# Patient Record
Sex: FEMALE | Race: WHITE | URBAN - METROPOLITAN AREA
[De-identification: names, ages, dates, MRNs, and addresses within clinical notes are randomized per-mention and may not be internally consistent; named-entity substitution may affect disease eponyms.]

---

## 2020-01-23 ENCOUNTER — OFFICE VISIT (OUTPATIENT)
Dept: PRIMARY CARE CLINIC | Age: 30
End: 2020-01-23

## 2020-01-23 VITALS
SYSTOLIC BLOOD PRESSURE: 102 MMHG | TEMPERATURE: 98.3 F | WEIGHT: 130.8 LBS | HEART RATE: 98 BPM | DIASTOLIC BLOOD PRESSURE: 63 MMHG | RESPIRATION RATE: 16 BRPM | BODY MASS INDEX: 21.02 KG/M2 | HEIGHT: 66 IN | OXYGEN SATURATION: 98 %

## 2020-01-23 DIAGNOSIS — J06.9 VIRAL URI WITH COUGH: Primary | ICD-10-CM

## 2020-01-23 DIAGNOSIS — R50.9 FEVER, UNSPECIFIED FEVER CAUSE: ICD-10-CM

## 2020-01-23 LAB
FLUAV+FLUBV AG NOSE QL IA.RAPID: NEGATIVE POS/NEG
FLUAV+FLUBV AG NOSE QL IA.RAPID: NEGATIVE POS/NEG
S PYO AG THROAT QL: NEGATIVE
VALID INTERNAL CONTROL?: YES
VALID INTERNAL CONTROL?: YES

## 2020-01-23 NOTE — PROGRESS NOTES
RM 4  Pt is nursing still    Chief Complaint   Patient presents with    Cold Symptoms     cough x 2 weeks, sore throat , low grade fever started yesterday,nasal congestion       Visit Vitals  /63 (BP 1 Location: Left arm, BP Patient Position: Sitting)   Pulse 98   Temp 98.3 °F (36.8 °C) (Oral)   Resp 16   Ht 5' 6\" (1.676 m)   Wt 130 lb 12.8 oz (59.3 kg)   SpO2 98%   BMI 21.11 kg/m²

## 2020-01-23 NOTE — PROGRESS NOTES
HISTORY OF PRESENT ILLNESS  Sarah Vital is a 26 y.o. female. Patient reports dry cough x 2 weeks, but congestion, fever, sore throat, and body aches started in the past 24 hours. Fever was 100.8. Patient has baby at home and wants to make sure she doesn't have the flu or strep. She has taken dayquil with some relief. Visit Vitals  /63 (BP 1 Location: Left arm, BP Patient Position: Sitting)   Pulse 98   Temp 98.3 °F (36.8 °C) (Oral)   Resp 16   Ht 5' 6\" (1.676 m)   Wt 130 lb 12.8 oz (59.3 kg)   LMP 08/23/2018 Comment: Pt is nursing   SpO2 98%   BMI 21.11 kg/m²       HPI    Review of Systems   Constitutional: Positive for fever. HENT: Positive for congestion and sore throat. Respiratory: Positive for cough. Musculoskeletal: Positive for myalgias. Physical Exam  Constitutional:       Appearance: Normal appearance. HENT:      Head: Normocephalic. Right Ear: Hearing, tympanic membrane, ear canal and external ear normal.      Left Ear: Hearing, tympanic membrane, ear canal and external ear normal.      Nose: Congestion and rhinorrhea present. Rhinorrhea is clear. Right Turbinates: Swollen. Left Turbinates: Swollen. Mouth/Throat:      Lips: Pink. Mouth: Mucous membranes are moist.      Pharynx: Oropharynx is clear. Neck:      Musculoskeletal: Normal range of motion. Cardiovascular:      Rate and Rhythm: Normal rate and regular rhythm. Pulmonary:      Effort: Pulmonary effort is normal.      Breath sounds: Normal breath sounds. Lymphadenopathy:      Cervical: No cervical adenopathy. Skin:     General: Skin is warm and dry. Neurological:      General: No focal deficit present. Mental Status: She is alert and oriented to person, place, and time. Psychiatric:         Mood and Affect: Mood normal.         Behavior: Behavior normal.         ASSESSMENT and PLAN    ICD-10-CM ICD-9-CM    1. Viral URI with cough J06.9 465.9     B97.89     2.  Fever, unspecified fever cause R50.9 780.60 AMB POC RAPID STREP A      AMB POC PANKAJ INFLUENZA A/B TEST     Orders Placed This Encounter    AMB POC RAPID STREP A    AMB POC PANKAJ INFLUENZA A/B TEST    DM/PE/acetaminophen/doxylamine (VICKS DAYQUIL-NYQUIL COLD-FLU PO)    PRENATAL VIT 54-QEOD FUM-FOLIC PO   add flonase  Rest and hydrate  Follow up if no improvement  Tylenol for fever

## 2020-01-30 ENCOUNTER — OFFICE VISIT (OUTPATIENT)
Dept: PRIMARY CARE CLINIC | Age: 30
End: 2020-01-30

## 2020-01-30 VITALS
BODY MASS INDEX: 21.44 KG/M2 | SYSTOLIC BLOOD PRESSURE: 121 MMHG | TEMPERATURE: 98 F | DIASTOLIC BLOOD PRESSURE: 75 MMHG | HEART RATE: 51 BPM | HEIGHT: 66 IN | RESPIRATION RATE: 18 BRPM | WEIGHT: 133.4 LBS | OXYGEN SATURATION: 98 %

## 2020-01-30 DIAGNOSIS — J01.00 ACUTE NON-RECURRENT MAXILLARY SINUSITIS: Primary | ICD-10-CM

## 2020-01-30 RX ORDER — FLUTICASONE PROPIONATE 50 MCG
2 SPRAY, SUSPENSION (ML) NASAL DAILY
Qty: 1 BOTTLE | Refills: 0 | Status: SHIPPED | OUTPATIENT
Start: 2020-01-30

## 2020-01-30 RX ORDER — AMOXICILLIN AND CLAVULANATE POTASSIUM 875; 125 MG/1; MG/1
1 TABLET, FILM COATED ORAL EVERY 12 HOURS
Qty: 14 TAB | Refills: 0 | Status: SHIPPED | OUTPATIENT
Start: 2020-01-30 | End: 2020-02-06

## 2020-01-30 NOTE — PROGRESS NOTES
Subjective:   Dg Woodruff is a 26 y.o. female who complains of congestion, headache, yellow nasal discharge,  and bilateral sinus pain for > 7  days, gradually worsening since that time. She denies a history of chills, fevers, shortness of breath and wheezing. Seen here last week for viral URI. Symptoms have persisted and worsened. Currently breastfeeding. Evaluation to date: none. Treatment to date: decongestants, OTC products. Patient does not smoke cigarettes. Relevant PMH: History reviewed. No pertinent past medical history. History reviewed. No pertinent surgical history. No Known Allergies        Review of Systems  Pertinent items are noted in HPI. Objective:     Visit Vitals  /75 (BP 1 Location: Left arm, BP Patient Position: Sitting)   Pulse (!) 51   Temp 98 °F (36.7 °C) (Oral)   Resp 18   Ht 5' 6\" (1.676 m)   Wt 133 lb 6.4 oz (60.5 kg)   SpO2 98%   BMI 21.53 kg/m²     General:  alert, cooperative, no distress   Eyes: negative   Ears: normal TM's and external ear canals AU   Sinuses: tenderness over bilateral maxillary   Mouth:  Lips, mucosa, and tongue normal. Teeth and gums normal and normal findings: oropharynx pink & moist without lesions or evidence of thrush   Neck: supple, symmetrical, trachea midline and no adenopathy. Heart: S1 and S2 normal, no murmurs noted. Lungs: clear to auscultation bilaterally        Assessment/Plan:       ICD-10-CM ICD-9-CM    1. Acute non-recurrent maxillary sinusitis J01.00 461.0      Orders Placed This Encounter    amoxicillin-clavulanate (AUGMENTIN) 875-125 mg per tablet    fluticasone propionate (FLONASE) 50 mcg/actuation nasal spray     Discussed the dx and tx of sinusitis. Suggested symptomatic OTC remedies. Antibiotics per orders. Nasal steroids per orders. RTC prn. Rosalee Park NP  This note will not be viewable in [address]

## 2020-01-30 NOTE — PATIENT INSTRUCTIONS
Sinusitis: Care Instructions Your Care Instructions Sinusitis is an infection of the lining of the sinus cavities in your head. Sinusitis often follows a cold. It causes pain and pressure in your head and face. In most cases, sinusitis gets better on its own in 1 to 2 weeks. But some mild symptoms may last for several weeks. Sometimes antibiotics are needed. Follow-up care is a key part of your treatment and safety. Be sure to make and go to all appointments, and call your doctor if you are having problems. It's also a good idea to know your test results and keep a list of the medicines you take. How can you care for yourself at home? · Take an over-the-counter pain medicine, such as acetaminophen (Tylenol), ibuprofen (Advil, Motrin), or naproxen (Aleve). Read and follow all instructions on the label. · If the doctor prescribed antibiotics, take them as directed. Do not stop taking them just because you feel better. You need to take the full course of antibiotics. · Be careful when taking over-the-counter cold or flu medicines and Tylenol at the same time. Many of these medicines have acetaminophen, which is Tylenol. Read the labels to make sure that you are not taking more than the recommended dose. Too much acetaminophen (Tylenol) can be harmful. · Breathe warm, moist air from a steamy shower, a hot bath, or a sink filled with hot water. Avoid cold, dry air. Using a humidifier in your home may help. Follow the directions for cleaning the machine. · Use saline (saltwater) nasal washes to help keep your nasal passages open and wash out mucus and bacteria. You can buy saline nose drops at a grocery store or drugstore. Or you can make your own at home by adding 1 teaspoon of salt and 1 teaspoon of baking soda to 2 cups of distilled water. If you make your own, fill a bulb syringe with the solution, insert the tip into your nostril, and squeeze gently. Stephan Purnima your nose. · Put a hot, wet towel or a warm gel pack on your face 3 or 4 times a day for 5 to 10 minutes each time. · Try a decongestant nasal spray like oxymetazoline (Afrin). Do not use it for more than 3 days in a row. Using it for more than 3 days can make your congestion worse. When should you call for help? Call your doctor now or seek immediate medical care if: 
  · You have new or worse swelling or redness in your face or around your eyes.  
  · You have a new or higher fever.  
 Watch closely for changes in your health, and be sure to contact your doctor if: 
  · You have new or worse facial pain.  
  · The mucus from your nose becomes thicker (like pus) or has new blood in it.  
  · You are not getting better as expected. Where can you learn more? Go to http://marcos-sai.info/. Enter O510 in the search box to learn more about \"Sinusitis: Care Instructions. \" Current as of: October 21, 2018 Content Version: 12.2 © 7596-2230 [employer]wise[employer]. Care instructions adapted under license by [employer] (which disclaims liability or warranty for this information). If you have questions about a medical condition or this instruction, always ask your healthcare professional. [address] any warranty or liability for your use of this information.

## 2020-01-30 NOTE — PROGRESS NOTES
Kenrick George is a 26 y.o. female    Room:5    Chief Complaint   Patient presents with    Cold Symptoms     i was here last week with a bad cough, alot of the symptoms have gone away except sinus symptoms, have a cough, noticed yesterday that mucus has changed to yellow and white, has taken day and night quil, stopped taken and started taking sudafed but stop taking it made me feel like i was in space and started taken afrin and stopped that as well\". Visit Vitals  /75 (BP 1 Location: Left arm, BP Patient Position: Sitting)   Pulse (!) 51   Temp 98 °F (36.7 °C) (Oral)   Resp 18   Ht 5' 6\" (1.676 m)   Wt 133 lb 6.4 oz (60.5 kg)   SpO2 98%   BMI 21.53 kg/m²       Pain Scale: 3/10    1. Have you been to the ER, urgent care clinic since your last visit? Hospitalized since your last visit? No    2. Have you seen or consulted any other health care providers outside of the [address] [place] [address] since your last visit? Include any pap smears or colon screening.  No

## 2020-05-16 ENCOUNTER — OFFICE VISIT (OUTPATIENT)
Dept: PRIMARY CARE CLINIC | Age: 30
End: 2020-05-16

## 2020-05-16 VITALS
DIASTOLIC BLOOD PRESSURE: 65 MMHG | HEART RATE: 76 BPM | RESPIRATION RATE: 16 BRPM | TEMPERATURE: 98.1 F | BODY MASS INDEX: 20.57 KG/M2 | WEIGHT: 128 LBS | SYSTOLIC BLOOD PRESSURE: 101 MMHG | OXYGEN SATURATION: 98 % | HEIGHT: 66 IN

## 2020-05-16 DIAGNOSIS — M54.9 UPPER BACK PAIN ON RIGHT SIDE: Primary | ICD-10-CM

## 2020-05-16 DIAGNOSIS — M62.830 BACK SPASM: ICD-10-CM

## 2020-05-16 RX ORDER — CYCLOBENZAPRINE HCL 10 MG
5-10 TABLET ORAL
Qty: 30 TAB | Refills: 0 | Status: SHIPPED | OUTPATIENT
Start: 2020-05-16

## 2020-05-16 RX ORDER — IBUPROFEN 600 MG/1
600 TABLET ORAL
Qty: 20 TAB | Refills: 0 | Status: SHIPPED | OUTPATIENT
Start: 2020-05-16

## 2020-05-16 NOTE — PATIENT INSTRUCTIONS
Back Spasm: Care Instructions Your Care Instructions A back spasm is sudden tightness and pain in your back muscles. It may happen from overuse or an injury. Things like sleeping in an awkward way, bending, lifting, standing, or sitting can sometimes cause a spasm. But the cause isn't always clear. Home treatment includes using heat or ice, taking over-the-counter (OTC) pain medicines, and avoiding activities that may cause back pain. For a back spasm that doesn't get better with home care, your doctor may prescribe medicine. Treatments such as massage or manipulation may also help ease a back spasm. Your doctor may also suggest exercise or physical therapy to help improve strength and flexibility in your back muscles. In most cases, getting back to your normal activities is good for your back. Just make sure to avoid doing things that make your pain worse. Follow-up care is a key part of your treatment and safety. Be sure to make and go to all appointments, and call your doctor if you are having problems. It's also a good idea to know your test results and keep a list of the medicines you take. How can you care for yourself at home? 
 Heat, ice, and medicines 
  · To relieve pain, use heat or ice (whichever feels better) on the affected area. ? Put a warm water bottle, a heating pad set on low, or a warm cloth on your back. Put a thin cloth between the heating pad and your skin. Do not go to sleep with a heating pad on your skin. ? Try ice or a cold pack on the area for 10 to 20 minutes at a time. Put a thin cloth between the ice and your skin.  
  · For most back pain you can take over-the-counter pain medicine. Nonsteroidal anti-inflammatory drugs (NSAIDs) such as ibuprofen or naproxen seem to work best. But if you can't take NSAIDs you can try acetaminophen. Your doctor can prescribe stronger medicines if needed. Be safe with medicines. Read and follow all instructions on the label.  Body positions and posture 
  · Sit or lie in positions that are most comfortable for you and that reduce pain. Try one of these positions when you lie down: ? Lie on your back with your knees bent and supported by large pillows. ? Lie on the floor with your legs on the seat of a sofa or chair. ? Lie on your side with your knees and hips bent and a pillow between your legs. ? Lie on your stomach if it does not make pain worse.  
  · Do not sit up in bed. Avoid soft couches and twisted positions.  
  · Avoid bed rest after the first day of back pain. Bed rest can help relieve pain at first, but it delays healing. Continued rest without activity is usually not good for your back.  
  · If you must sit for long periods of time, take breaks from sitting. Change positions every 30 minutes. Get up and walk around, or lie in a comfortable position. Activity 
  · Take short walks several times a day. You can start with 5 to 10 minutes, 3 or 4 times a day, and work up to longer walks. Walk on level surfaces and avoid hills and stairs until your back starts to feel better.  
  · After your back spasm starts to feel better, try to stretch your muscles every day, especially before and after exercise and at bedtime. Regular stretching can help relax your muscles.  
  · To prevent future back pain, do exercises to stretch and strengthen your back and stomach. Learn to use good posture, safe lifting techniques, and other ways to move to help you avoid back pain. When should you call for help? Call 911 anytime you think you may need emergency care. For example, call if: 
  · You are unable to move an arm or a leg at all.  
[facility] your doctor now or seek immediate medical care if: 
  · You have new or worse symptoms in your legs, belly, or buttocks. Symptoms may include: 
? Numbness or tingling. ? Weakness. ? Pain.  
  · You lose bladder or bowel control.  Watch closely for changes in your health, and be sure to contact your doctor if: 
  · You have a fever, lose weight, or don't feel well.  
  · You do not get better as expected. Where can you learn more? Go to http://marcos-sai.info/ Enter E232 in the search box to learn more about \"Back Spasm: Care Instructions. \" Current as of: June 26, 2019Content Version: 12.4 © 8698-3586 Healthwise, Incorporated. Care instructions adapted under license by [employer] (which disclaims liability or warranty for this information). If you have questions about a medical condition or this instruction, always ask your healthcare professional. [address] any warranty or liability for your use of this information.

## 2020-05-16 NOTE — PROGRESS NOTES
RM 4    Chief Complaint   Patient presents with    Spasms     back spasms , right shoulder into neck . started in the early morning.        Visit Vitals  /65 (BP 1 Location: Right arm, BP Patient Position: Sitting)   Pulse 76   Temp 98.1 °F (36.7 °C) (Skin)   Resp 16   Ht 5' 6\" (1.676 m)   Wt 128 lb (58.1 kg)   SpO2 98%   BMI 20.66 kg/m²

## 2020-05-16 NOTE — PROGRESS NOTES
HISTORY OF PRESENT ILLNESS  Juliann Villatoro is a 31 y.o. female. HPI  Chief Complaint   Patient presents with    Spasms     back spasms , right shoulder into neck . started in the early morning. Pt presents with complaints of right upper back and neck pain that started this morning at 0530. She complains of pain and spasms which is causing her to feel dizzy and nauseated at times. She took ibuprofen 800mg without relief. Denies any specific injury or trauma. Very active with barre classes with focus on upper body. Currently breastfeeding/pumping which is winding down (baby is almost 12 mo). No past medical history on file. No past surgical history on file. No Known Allergies      Review of Systems   Constitutional: Negative for chills, fever and malaise/fatigue. Respiratory: Negative for cough, shortness of breath and wheezing. Cardiovascular: Negative for chest pain. Gastrointestinal: Positive for nausea. Negative for abdominal pain and vomiting. Musculoskeletal: Positive for back pain and neck pain. Neurological: Positive for dizziness. Negative for tingling, weakness and headaches. Physical Exam  Constitutional:       General: She is not in acute distress. Appearance: Normal appearance. She is not ill-appearing, toxic-appearing or diaphoretic. Comments: Appears to be uncomfortable, holding her R neck/posterior shoulder area   Eyes:      General: No scleral icterus. Conjunctiva/sclera: Conjunctivae normal.      Pupils: Pupils are equal, round, and reactive to light. Neck:      Musculoskeletal: Decreased range of motion. Pain with movement and muscular tenderness present. No neck rigidity, injury or spinous process tenderness. Musculoskeletal:      Right shoulder: She exhibits decreased range of motion and pain. She exhibits no tenderness, no bony tenderness, no crepitus, normal pulse and normal strength.         Arms:       Comments: Tenderness and spasm as noted on diagram   Neurological:      Mental Status: She is alert. ASSESSMENT and PLAN    ICD-10-CM ICD-9-CM    1. Upper back pain on right side M54.9 724.5    2. Back spasm M62.830 724.8      Orders Placed This Encounter    ibuprofen (MOTRIN) 600 mg tablet    cyclobenzaprine (FLEXERIL) 10 mg tablet   -precautions given. Reviewed breastfeeding cautions with her and based on info provided recommended pump/dump while taking med, resume breastfeeding 24 hours after last dose of med. Take ibuprofen with food. Ice/heat, gentle stretching. F/u if persistent or worsening symptoms. Bárbara Coon NP  This note will not be viewable in [address]